# Patient Record
Sex: MALE | Race: WHITE | HISPANIC OR LATINO | Employment: FULL TIME | URBAN - METROPOLITAN AREA
[De-identification: names, ages, dates, MRNs, and addresses within clinical notes are randomized per-mention and may not be internally consistent; named-entity substitution may affect disease eponyms.]

---

## 2024-08-08 ENCOUNTER — APPOINTMENT (OUTPATIENT)
Dept: PRIMARY CARE | Facility: CLINIC | Age: 39
End: 2024-08-08

## 2024-08-08 DIAGNOSIS — M54.50 ACUTE LOW BACK PAIN, UNSPECIFIED BACK PAIN LATERALITY, UNSPECIFIED WHETHER SCIATICA PRESENT: Primary | ICD-10-CM

## 2024-08-08 DIAGNOSIS — R10.32 LEFT LOWER QUADRANT ABDOMINAL PAIN: ICD-10-CM

## 2024-08-08 DIAGNOSIS — R32: ICD-10-CM

## 2024-08-08 PROCEDURE — 99214 OFFICE O/P EST MOD 30 MIN: CPT | Performed by: FAMILY MEDICINE

## 2024-08-08 ASSESSMENT — ENCOUNTER SYMPTOMS
FREQUENCY: 0
PALPITATIONS: 0
SHORTNESS OF BREATH: 0
DYSURIA: 1
DIFFICULTY URINATING: 1

## 2024-08-08 NOTE — PROGRESS NOTES
Chief Complaint Diaz Caal is a 39 y.o. male who presents for back pain with radiation into the abdomen    HPI:  Pt states that h/o kidney stones and his pains feel exactly like his past kidney stones. Patient states that his pain starts in his back then radiates around waist in between hip bone and groin. Patient also endorses some difficulty urinating and feels like he needs to have a bowel movement. Denies any fever, chills, blood in the urine, or burning when he pees.        ROS:  Review of Systems   Respiratory:  Negative for shortness of breath.    Cardiovascular:  Negative for chest pain and palpitations.   Genitourinary:  Positive for difficulty urinating and dysuria. Negative for frequency.       Meds:  No current outpatient medications on file.    Allergies:  Not on File    PE:  There were no vitals taken for this visit.  Limited due to telehealth exam      Assessment/Plan  Diaz Caal is a 39 y.o. male who presents for back pain with radiation in the abdomen. This is likely nephrolithiasis but cannot rule out other etiologies at this time. A order for CT abdomen and pelvis was placed to look for nephrolithiasis and rule out other etiologies. Patient agreed with treatment and plan.    Diagnoses and all orders for this visit:  Acute low back pain, unspecified back pain laterality, unspecified whether sciatica present (Primary)  -     CT abdomen pelvis wo IV contrast; Future  Unspecified elimination disorder, with urinary symptoms  -     CT abdomen pelvis wo IV contrast; Future  Left lower quadrant abdominal pain  -     CT abdomen pelvis wo IV contrast; Future         Follow up with results.      Patient was staffed with Dr. Titi Lebron DO, PGY-2  [facility]

## 2024-08-10 NOTE — PROGRESS NOTES
I reviewed and examined the patient. I was present for the key exam elements, and I fully participated in the patient's care. I discussed the management of the care with the resident. I have personally reviewed the pertinent labs and imaging, as well as recent notes, with the patient. I have reviewed the note above and agree with the resident's medical decision making as documented in the resident's note, in addition to the following comments / findings:     Agree with the rest of the plan outlined below by resident physician. No red flags.      The patient understands and agrees to the assessment and plan of care. Patient has also agreed to follow up and comply with the treatment and evaluation as recommended today. Patient was instructed to call the office at [phone] should questions arise regarding their treatment or care.     Godwin Walls DO, FAOASM  Family Medicine   [address], [address]  [place] [address]     Godwin Walls DO

## 2024-08-27 ENCOUNTER — HOSPITAL ENCOUNTER (OUTPATIENT)
Dept: RADIOLOGY | Facility: HOSPITAL | Age: 39
Discharge: HOME | End: 2024-08-27
Payer: COMMERCIAL

## 2024-08-27 DIAGNOSIS — R10.32 LEFT LOWER QUADRANT ABDOMINAL PAIN: ICD-10-CM

## 2024-08-27 DIAGNOSIS — M54.50 ACUTE LOW BACK PAIN, UNSPECIFIED BACK PAIN LATERALITY, UNSPECIFIED WHETHER SCIATICA PRESENT: ICD-10-CM

## 2024-08-27 DIAGNOSIS — R32: ICD-10-CM

## 2024-08-27 PROCEDURE — 74176 CT ABD & PELVIS W/O CONTRAST: CPT

## 2024-08-28 ENCOUNTER — TELEPHONE (OUTPATIENT)
Dept: PRIMARY CARE | Facility: CLINIC | Age: 39
End: 2024-08-28
Payer: COMMERCIAL

## 2024-08-28 ENCOUNTER — HOSPITAL ENCOUNTER (EMERGENCY)
Facility: HOSPITAL | Age: 39
Discharge: HOME | End: 2024-08-28
Attending: STUDENT IN AN ORGANIZED HEALTH CARE EDUCATION/TRAINING PROGRAM
Payer: COMMERCIAL

## 2024-08-28 VITALS
HEART RATE: 85 BPM | WEIGHT: 233.47 LBS | BODY MASS INDEX: 38.9 KG/M2 | OXYGEN SATURATION: 96 % | RESPIRATION RATE: 16 BRPM | TEMPERATURE: 98.1 F | SYSTOLIC BLOOD PRESSURE: 149 MMHG | DIASTOLIC BLOOD PRESSURE: 96 MMHG | HEIGHT: 65 IN

## 2024-08-28 DIAGNOSIS — N20.0 KIDNEY STONE ON LEFT SIDE: Primary | ICD-10-CM

## 2024-08-28 LAB
ALBUMIN SERPL-MCNC: 4.9 G/DL (ref 3.5–5)
ALP BLD-CCNC: 79 U/L (ref 35–125)
ALT SERPL-CCNC: 34 U/L (ref 5–40)
ANION GAP SERPL CALC-SCNC: 10 MMOL/L
APPEARANCE UR: CLEAR
AST SERPL-CCNC: 21 U/L (ref 5–40)
BASOPHILS # BLD AUTO: 0.03 X10*3/UL (ref 0–0.1)
BASOPHILS NFR BLD AUTO: 0.3 %
BILIRUB SERPL-MCNC: 0.6 MG/DL (ref 0.1–1.2)
BILIRUB UR STRIP.AUTO-MCNC: NEGATIVE MG/DL
BUN SERPL-MCNC: 8 MG/DL (ref 8–25)
CALCIUM SERPL-MCNC: 9.8 MG/DL (ref 8.5–10.4)
CHLORIDE SERPL-SCNC: 99 MMOL/L (ref 97–107)
CO2 SERPL-SCNC: 29 MMOL/L (ref 24–31)
COLOR UR: YELLOW
CREAT SERPL-MCNC: 1 MG/DL (ref 0.4–1.6)
EGFRCR SERPLBLD CKD-EPI 2021: >90 ML/MIN/1.73M*2
EOSINOPHIL # BLD AUTO: 0.04 X10*3/UL (ref 0–0.7)
EOSINOPHIL NFR BLD AUTO: 0.4 %
ERYTHROCYTE [DISTWIDTH] IN BLOOD BY AUTOMATED COUNT: 12.3 % (ref 11.5–14.5)
GLUCOSE SERPL-MCNC: 99 MG/DL (ref 65–99)
GLUCOSE UR STRIP.AUTO-MCNC: NORMAL MG/DL
HCT VFR BLD AUTO: 51.2 % (ref 41–52)
HGB BLD-MCNC: 17.8 G/DL (ref 13.5–17.5)
IMM GRANULOCYTES # BLD AUTO: 0.04 X10*3/UL (ref 0–0.7)
IMM GRANULOCYTES NFR BLD AUTO: 0.4 % (ref 0–0.9)
KETONES UR STRIP.AUTO-MCNC: ABNORMAL MG/DL
LEUKOCYTE ESTERASE UR QL STRIP.AUTO: NEGATIVE
LYMPHOCYTES # BLD AUTO: 2.43 X10*3/UL (ref 1.2–4.8)
LYMPHOCYTES NFR BLD AUTO: 22.6 %
MCH RBC QN AUTO: 30.4 PG (ref 26–34)
MCHC RBC AUTO-ENTMCNC: 34.8 G/DL (ref 32–36)
MCV RBC AUTO: 88 FL (ref 80–100)
MONOCYTES # BLD AUTO: 0.79 X10*3/UL (ref 0.1–1)
MONOCYTES NFR BLD AUTO: 7.4 %
MUCOUS THREADS #/AREA URNS AUTO: NORMAL /LPF
NEUTROPHILS # BLD AUTO: 7.41 X10*3/UL (ref 1.2–7.7)
NEUTROPHILS NFR BLD AUTO: 68.9 %
NITRITE UR QL STRIP.AUTO: NEGATIVE
NRBC BLD-RTO: 0 /100 WBCS (ref 0–0)
PH UR STRIP.AUTO: 5.5 [PH]
PLATELET # BLD AUTO: 281 X10*3/UL (ref 150–450)
POTASSIUM SERPL-SCNC: 3.7 MMOL/L (ref 3.4–5.1)
PROT SERPL-MCNC: 7.9 G/DL (ref 5.9–7.9)
PROT UR STRIP.AUTO-MCNC: ABNORMAL MG/DL
RBC # BLD AUTO: 5.85 X10*6/UL (ref 4.5–5.9)
RBC # UR STRIP.AUTO: NEGATIVE /UL
RBC #/AREA URNS AUTO: NORMAL /HPF
SODIUM SERPL-SCNC: 138 MMOL/L (ref 133–145)
SP GR UR STRIP.AUTO: 1.03
UROBILINOGEN UR STRIP.AUTO-MCNC: ABNORMAL MG/DL
WBC # BLD AUTO: 10.7 X10*3/UL (ref 4.4–11.3)
WBC #/AREA URNS AUTO: NORMAL /HPF

## 2024-08-28 PROCEDURE — 85025 COMPLETE CBC W/AUTO DIFF WBC: CPT | Performed by: STUDENT IN AN ORGANIZED HEALTH CARE EDUCATION/TRAINING PROGRAM

## 2024-08-28 PROCEDURE — 36415 COLL VENOUS BLD VENIPUNCTURE: CPT | Performed by: STUDENT IN AN ORGANIZED HEALTH CARE EDUCATION/TRAINING PROGRAM

## 2024-08-28 PROCEDURE — 96374 THER/PROPH/DIAG INJ IV PUSH: CPT

## 2024-08-28 PROCEDURE — 81001 URINALYSIS AUTO W/SCOPE: CPT | Performed by: STUDENT IN AN ORGANIZED HEALTH CARE EDUCATION/TRAINING PROGRAM

## 2024-08-28 PROCEDURE — 99284 EMERGENCY DEPT VISIT MOD MDM: CPT

## 2024-08-28 PROCEDURE — 80053 COMPREHEN METABOLIC PANEL: CPT | Performed by: STUDENT IN AN ORGANIZED HEALTH CARE EDUCATION/TRAINING PROGRAM

## 2024-08-28 PROCEDURE — 2500000004 HC RX 250 GENERAL PHARMACY W/ HCPCS (ALT 636 FOR OP/ED): Performed by: STUDENT IN AN ORGANIZED HEALTH CARE EDUCATION/TRAINING PROGRAM

## 2024-08-28 RX ORDER — ONDANSETRON 4 MG/1
4 TABLET, ORALLY DISINTEGRATING ORAL EVERY 8 HOURS PRN
Qty: 20 TABLET | Refills: 0 | Status: SHIPPED | OUTPATIENT
Start: 2024-08-28 | End: 2024-09-04

## 2024-08-28 RX ORDER — KETOROLAC TROMETHAMINE 10 MG/1
10 TABLET, FILM COATED ORAL EVERY 6 HOURS PRN
Qty: 20 TABLET | Refills: 0 | Status: SHIPPED | OUTPATIENT
Start: 2024-08-28 | End: 2024-09-02

## 2024-08-28 RX ORDER — KETOROLAC TROMETHAMINE 30 MG/ML
15 INJECTION, SOLUTION INTRAMUSCULAR; INTRAVENOUS ONCE
Status: COMPLETED | OUTPATIENT
Start: 2024-08-28 | End: 2024-08-28

## 2024-08-28 ASSESSMENT — COLUMBIA-SUICIDE SEVERITY RATING SCALE - C-SSRS
6. HAVE YOU EVER DONE ANYTHING, STARTED TO DO ANYTHING, OR PREPARED TO DO ANYTHING TO END YOUR LIFE?: NO
2. HAVE YOU ACTUALLY HAD ANY THOUGHTS OF KILLING YOURSELF?: NO
1. IN THE PAST MONTH, HAVE YOU WISHED YOU WERE DEAD OR WISHED YOU COULD GO TO SLEEP AND NOT WAKE UP?: NO

## 2024-08-28 ASSESSMENT — PAIN SCALES - GENERAL: PAINLEVEL_OUTOF10: 3

## 2024-08-28 ASSESSMENT — PAIN DESCRIPTION - FREQUENCY: FREQUENCY: INTERMITTENT

## 2024-08-28 ASSESSMENT — PAIN DESCRIPTION - DESCRIPTORS
DESCRIPTORS: PRESSURE
DESCRIPTORS: DULL;ACHING;PRESSURE

## 2024-08-28 ASSESSMENT — PAIN - FUNCTIONAL ASSESSMENT: PAIN_FUNCTIONAL_ASSESSMENT: 0-10

## 2024-08-28 ASSESSMENT — PAIN DESCRIPTION - ONSET: ONSET: GRADUAL

## 2024-08-28 ASSESSMENT — PAIN DESCRIPTION - PROGRESSION: CLINICAL_PROGRESSION: NOT CHANGED

## 2024-08-28 ASSESSMENT — PAIN DESCRIPTION - PAIN TYPE: TYPE: ACUTE PAIN

## 2024-08-28 ASSESSMENT — PAIN DESCRIPTION - LOCATION: LOCATION: BACK

## 2024-08-28 ASSESSMENT — PAIN DESCRIPTION - ORIENTATION: ORIENTATION: LEFT;LOWER

## 2024-08-28 NOTE — TELEPHONE ENCOUNTER
Result Communication    Resulted Orders   CT abdomen pelvis wo IV contrast    Narrative    Interpreted By:  Sara Galloway,   STUDY:  CT ABDOMEN PELVIS WO IV CONTRAST;  8/27/2024 4:19 pm      INDICATION:  Signs/Symptoms:Back pain with radiation into abdomen, h/o kidney  stones.      ,M54.50 Low back pain, unspecified,R32 Unspecified urinary  incontinence,R10.32 Left lower quadrant pain      COMPARISON:  None      ACCESSION NUMBER(S):  [id number]      ORDERING CLINICIAN:  SUBHA MARTIN      TECHNIQUE:      Helical scans with axial reconstructions are performed from the lung  bases through the pubic symphysis with no oral or intravenous  contrast administered. Sagittal and coronal reformations are  completed by the technologist at the acquisition scanner.      FINDINGS:  Please note that the evaluation of vessels, lymph nodes and organs is  limited without intravenous contrast.      LOWER CHEST:  Within normal limits      ABDOMEN:      LIVER:  Within normal limits.      BILE DUCTS:  The intrahepatic and extrahepatic ducts are not dilated.      GALLBLADDER:  No calcified stones. No wall thickening.      PANCREAS:  Within normal limits.      SPLEEN:  Within normal limits.      ADRENAL GLANDS:  Within normal limits.      KIDNEYS AND URETERS:  There is a 0.5 x 0.8 x 1.0 cm calculus at the left ureteropelvic  junction producing mild left hydronephrosis. No additional left-sided  renal or ureteral calculus is seen. On the right, there is a 3 mm  nonobstructing intrarenal calculus seen. No right-sided  hydronephrosis or right-sided ureteral calculus is present. No renal  mass on either side is seen.      PELVIS:      BLADDER:  The urinary bladder appears normal without abnormal wall thickening.      REPRODUCTIVE ORGANS:  The prostate is not enlarged.      BOWEL:  The stomach, small and large bowel are normal in appearance without  wall thickening or obstruction.   The appendix appears normal.      VESSELS:  The aorta and IVC  appear normal.      PERITONEUM/RETROPERITONEUM/LYMPH NODES:  There is no free or loculated fluid collection, no free  intraperitoneal air. The retroperitoneum appears normal.  No  abdominopelvic lymphadenopathy is present.      ABDOMINAL WALL:  The abdominal wall soft tissues appear normal.      BONES:  Within normal limits.        Impression    1.  0.5 x 0.8 x 1.0 cm calculus at left ureteropelvic junction  producing mild left hydronephrosis.  2. 3 mm nonobstructing right renal calculus.          MACRO:  Critical Finding:  See findings. Notification was initiated on  8/28/2024 at 7:00 pm by  Sara Galloway.  (**-YCF-**) Instructions:      Signed by: Sara Galloway 8/28/2024 7:00 PM  Dictation workstation:   [id number]       7:25 PM      Results were successfully communicated with the patient and they acknowledged their understanding.    Called the patient about his results. Patient stated that he is noting some chills, increase in pain, and not feeling well. Recommended that the patient go to the ER for evaluation due to potential kidney infection 2/2 obstructing nephrolithiasis. Patient was agreeable to this plan and stated that he preferred to go to [facility]. [facility] was called and given report. Also told patient that if he changes his mind to call the answering service back so we can start him on some antibiotics, tamsulosin, and get a UA with urine culture and some bloodwork.    Italia Lebron DO, PGY-3  [facility]

## 2024-08-29 ENCOUNTER — OFFICE VISIT (OUTPATIENT)
Dept: UROLOGY | Facility: CLINIC | Age: 39
End: 2024-08-29
Payer: COMMERCIAL

## 2024-08-29 VITALS — WEIGHT: 233 LBS | HEIGHT: 65 IN | BODY MASS INDEX: 38.82 KG/M2

## 2024-08-29 DIAGNOSIS — N20.0 LEFT RENAL STONE: Primary | ICD-10-CM

## 2024-08-29 PROCEDURE — 3008F BODY MASS INDEX DOCD: CPT | Performed by: UROLOGY

## 2024-08-29 PROCEDURE — 99204 OFFICE O/P NEW MOD 45 MIN: CPT | Performed by: UROLOGY

## 2024-08-29 ASSESSMENT — PAIN SCALES - GENERAL: PAINLEVEL: 2

## 2024-08-29 NOTE — DISCHARGE INSTRUCTIONS
Optimal management of a kidney stone includes adequate hydration, pain control with oxycodone or toradol. You may also be prescribed Flomax to promote passage of the stone and zofran for treatment of nausea. Most kidney stones pass within 2-3 days after symptoms start. Follow up with urology Dr. Goldstein in 24-48 hours to schedule an appointment for further evaluation and management. Return to the ED for any new or concerning symptoms including fever over 100.4 farenheit which would be suggestive of infection, worsening flank pain that you are unable to manage at home, or any worsening symptoms despite treatment.

## 2024-08-30 PROBLEM — N20.0 LEFT RENAL STONE: Status: ACTIVE | Noted: 2024-08-29

## 2024-08-30 NOTE — ED PROVIDER NOTES
HPI   Chief Complaint   Patient presents with    Flank Pain     Pt states he had a ct scan yesterday and dr called him and told him the kidney stone had caused an obstruction. Pt states intermittent pain in the left flank that radiates to left hip and LLQ.    Abdominal Pain       This is a 39-year-old male who presents to the emergency department for evaluation of left-sided flank pain.  He states that he has had flank pain intermittently for the past 1 to 2 months, when the pain comes on it is unbearable, can be up to 10 out of 10 in severity, effects the left flank and radiates down into the left side of the groin.  He does have a history of kidney stones, he had CT imaging performed on the 27th of this month showing a large 1 cm proximal kidney stone on the left side likely causative of his symptoms.  He was instructed to come to the ED after this was identified for further assessment and management because he was having increasing pain in his left flank for the past day or 2.      History provided by:  Patient and medical records   used: No            Patient History   History reviewed. No pertinent past medical history.  History reviewed. No pertinent surgical history.  No family history on file.  Social History     Tobacco Use    Smoking status: Never    Smokeless tobacco: Current     Types: Chew   Vaping Use    Vaping status: Never Used   Substance Use Topics    Alcohol use: Not Currently    Drug use: Never       Physical Exam   ED Triage Vitals [08/28/24 2033]   Temperature Heart Rate Respirations BP   36.7 °C (98.1 °F) 85 16 (!) 149/96      Pulse Ox Temp Source Heart Rate Source Patient Position   96 % Tympanic Monitor Sitting      BP Location FiO2 (%)     Right arm --       Physical Exam  General: well developed, well nourished adult male who is awake and alert, in no apparent distress  CV: regular rate and rhythm, no murmur, no gallops, or rubs.  Resp: clear to ascultation bilaterally,  no wheezes, rales, or rhonchi  GI: abdomen soft, mild left lower abdominal tenderness to palpation without rigidity or guarding, no peritoneal signs, abdomen is nondistended, no masses palpated  MSK: No midline spinal tenderness, no CVA tenderness, no swelling of the extremities.  Neuro: no focal deficits, CN2-12 intact. Sensation fully intact.  Psych: appropriate mood and affect, cooperative with exam  Skin: warm, dry, without evidence of rash or abrasions    ED Course & MDM   Diagnoses as of 08/30/24 1059   Kidney stone on left side                 No data recorded     [place] Coma Scale Score: 15 (08/28/24 2057 : Galen Harris RN)                           Medical Decision Making  The patient is relatively comfortable appearing in the ED, he is in no acute distress.  He complains that he has a headache which is more severe than his flank pain which is mild at this time.  He is hypertensive, vitals otherwise normal.  He is afebrile, not tachycardic.  Medical workup in the ED today shows no leukocytosis, no bacteria or nitrates in the urine, no evidence of UTI.  No evidence of sepsis.  Kidney function is normal.  I reviewed the CT image from his most recent visit showing a 1 cm stone at the left renal pelvis/UPJ with mild hydronephrosis present.  There is no evidence of complete obstruction of the ureter on this image.  I do not feel there is a strong indication to repeat imaging today.  He has not passed the kidney stone to his knowledge.  He was given IV Toradol for pain control with resolution of his symptoms.    Case was discussed with urologist Dr. Goldstein who recommends having the patient follow-up in the office for further treatment planning, he did not feel the patient emergently needs a stent or intervention at this time given his otherwise benign workup and well-controlled symptoms.  Return precautions were discussed including signs of infection, intractable pain, intractable nausea or vomiting  although which may require additional management at the hospital.  The patient is agreeable to plan, he will follow-up later this week with the urologist, he was discharged in improved condition.      Procedure  Procedures     Juan Radford DO  08/30/24 1104

## 2024-08-30 NOTE — PROGRESS NOTES
"  Patient is a 39 y.o. male presenting with a left renal stone    SUBJECTIVE:  HPI   CT scan identified an 8 mm stone in the proximal left ureter.  He has not seen a stone pass.  A 3 mm right non obstruucting renal stone was also present.  He has had intermittent pain for a couple months.        No results found for: \"URINECULTURE\"     No past medical history on file.   No past surgical history on file.   No family history on file.   Social History     Socioeconomic History    Marital status:    Tobacco Use    Smoking status: Never    Smokeless tobacco: Current     Types: Chew   Vaping Use    Vaping status: Never Used   Substance and Sexual Activity    Alcohol use: Not Currently    Drug use: Never    Sexual activity: Yes        Review of Systems   Constitutional: denies any unintentional weight loss or change in strength.  Integumentary: denies any rashes or pruritus.  Eyes: denies any double vision or eye pain.  Ear/Nose/Mouth/Throat: denies any nosebleeds or gum bleeds.  Cardiovascular: denies any chest pain or syncope.  Respiratory: denies hemoptysis.  Gastrointestinal: denies nausea or vomiting.  Musculoskeletal: denies muscle cramping or weakness.  Neurologic: denies convulsions or seizures.  Hematologic/Lymphatic: denies bleeding tendencies.  Endocrine: denies heat/cold intolerance.  All other systems have been reviewed and are negative unless otherwise noted in the HPI.    OBJECTIVE:  There were no vitals taken for this visit.  Physical Exam   Constitutional: No obvious distress.  Eyes: Non-injected conjunctiva, sclera clear, EOMI.  Ears/Nose/Mouth/Throat: No obvious drainage per ears or nose.  Cardiovascular: Extremities are warm and well perfused. No edema, cyanosis or pallor.  Respiratory: No audible wheezing/stridor; respirations do not appear labored.  Gastrointestinal: Abdomen soft, not distended.  Musculoskeletal: Normal ROM of extremities.  Skin: No obvious rashes or open sores.  Neurologic: " "Alert and oriented, CN 2-12 grossly intact.  Psychiatric: Answers questions appropriately with normal affect.  Hematologic/Lymphatic/Immunologic: No obvious bruises or sites of spontaneous bleeding.  Genitourinary: No CVA tenderness, bladder not palpable.     Labs:  Lab Results   Component Value Date    WBC 10.7 08/28/2024    HGB 17.8 (H) 08/28/2024    HCT 51.2 08/28/2024     08/28/2024    CHOL 170 05/06/2022    TRIG 120 05/06/2022    HDL 44 05/06/2022    ALT 34 08/28/2024    AST 21 08/28/2024     08/28/2024    K 3.7 08/28/2024    CL 99 08/28/2024    CREATININE 1.00 08/28/2024    BUN 8 08/28/2024    CO2 29 08/28/2024    TSH 1.13 05/06/2022    HGBA1C 5.2 05/06/2022     No results found for: \"KPSAT\", \"KPSAP\"  IMAGING:        PROCEDURES:    ASSESSMENT/PLAN:  Problem List Items Addressed This Visit    None  Visit Diagnoses       Left renal stone    -  Primary    Relevant Orders    Case Request Operating Room: Lithotripsy Extracorporeal Shock Wave (Completed)    Request for Pre-Admission Testing Visit    XR abdomen 1 view            He has an 8 mm stone in the proximal left ureter.  We reviewed options for treatment.  He was sent for a KUB today.  He will be scheduled for left ESWL.  Hold blood thinners for a week prior.    All questions were answered to the patient’s satisfaction.  Patient agrees with the plan and wishes to proceed.  Follow-up will be scheduled appropriately.     Juwan Goldstein MD  "

## 2024-09-09 ENCOUNTER — APPOINTMENT (OUTPATIENT)
Dept: UROLOGY | Facility: HOSPITAL | Age: 39
End: 2024-09-09
Payer: COMMERCIAL

## 2024-09-11 ENCOUNTER — PRE-ADMISSION TESTING (OUTPATIENT)
Dept: PREADMISSION TESTING | Facility: HOSPITAL | Age: 39
End: 2024-09-11
Payer: COMMERCIAL

## 2024-09-11 ENCOUNTER — HOSPITAL ENCOUNTER (OUTPATIENT)
Dept: RADIOLOGY | Facility: HOSPITAL | Age: 39
Discharge: HOME | End: 2024-09-11
Payer: COMMERCIAL

## 2024-09-11 VITALS
HEART RATE: 90 BPM | WEIGHT: 240 LBS | DIASTOLIC BLOOD PRESSURE: 91 MMHG | BODY MASS INDEX: 39.99 KG/M2 | HEIGHT: 65 IN | OXYGEN SATURATION: 100 % | RESPIRATION RATE: 16 BRPM | TEMPERATURE: 97.3 F | SYSTOLIC BLOOD PRESSURE: 133 MMHG

## 2024-09-11 DIAGNOSIS — N20.0 LEFT RENAL STONE: ICD-10-CM

## 2024-09-11 DIAGNOSIS — Z01.818 PRE-OP TESTING: Primary | ICD-10-CM

## 2024-09-11 PROCEDURE — 99204 OFFICE O/P NEW MOD 45 MIN: CPT | Performed by: PHYSICIAN ASSISTANT

## 2024-09-11 PROCEDURE — 74018 RADEX ABDOMEN 1 VIEW: CPT

## 2024-09-11 RX ORDER — KETOROLAC TROMETHAMINE 10 MG/1
10 TABLET, FILM COATED ORAL EVERY 6 HOURS PRN
COMMUNITY

## 2024-09-11 ASSESSMENT — DUKE ACTIVITY SCORE INDEX (DASI)
CAN YOU WALK A BLOCK OR TWO ON LEVEL GROUND: YES
DASI METS SCORE: 9.9
CAN YOU DO YARD WORK LIKE RAKING LEAVES, WEEDING OR PUSHING A MOWER: YES
CAN YOU RUN A SHORT DISTANCE: YES
CAN YOU DO LIGHT WORK AROUND THE HOUSE LIKE DUSTING OR WASHING DISHES: YES
CAN YOU WALK INDOORS, SUCH AS AROUND YOUR HOUSE: YES
CAN YOU PARTICIPATE IN MODERATE RECREATIONAL ACTIVITIES LIKE GOLF, BOWLING, DANCING, DOUBLES TENNIS OR THROWING A BASEBALL OR FOOTBALL: YES
TOTAL_SCORE: 58.2
CAN YOU TAKE CARE OF YOURSELF (EAT, DRESS, BATHE, OR USE TOILET): YES
CAN YOU PARTICIPATE IN STRENOUS SPORTS LIKE SWIMMING, SINGLES TENNIS, FOOTBALL, BASKETBALL, OR SKIING: YES
CAN YOU HAVE SEXUAL RELATIONS: YES
CAN YOU CLIMB A FLIGHT OF STAIRS OR WALK UP A HILL: YES
CAN YOU DO MODERATE WORK AROUND THE HOUSE LIKE VACUUMING, SWEEPING FLOORS OR CARRYING GROCERIES: YES
CAN YOU DO HEAVY WORK AROUND THE HOUSE LIKE SCRUBBING FLOORS OR LIFTING AND MOVING HEAVY FURNITURE: YES

## 2024-09-11 NOTE — CPM/PAT H&P
"CPM/PAT Evaluation       Name: Diaz Caal (Diaz Caal)  /Age: 1985/39 y.o.     In-Person       Chief Complaint: \"kidney stone\"    HPI  The patient is a 39 year old male with a past history of kidney stones.  He has never required surgical intervention.  Over the past 2 months he has had intermittent episodes of left flank pain that radiates to the left inguinal area and umbilicus.  He has associated urinary hesitancy and a weak stream.  He denied dysuria or hematuria.  The episodes would last for 1-2 hours and they would resolve spontaneously.  On 2024 he had a severe episode that lasted several hours.  He had associated chills.  His PCP ordered a CT of the abdomen and pelvis on 2024 and this demonstrated a 0.5 x 0.8 x 1.0 cm calculus at left ureteropelvic junction  producing mild left hydronephrosis.  2. 3 mm nonobstructing right renal calculus.  He was referred to [facility] ED on 2024 and Dr. Goldstein was consulted.  He was given Toradol for pain and he improved.  Surgical intervention was recommended and this is scheduled for 2024.    Past Medical History:   Diagnosis Date    Nephrolithiasis     Obesity        Past Surgical History:   Procedure Laterality Date    KNEE SURGERY Left     Arthroscopy     Social History     Tobacco Use    Smoking status: Never    Smokeless tobacco: Former     Types: Chew     Quit date: 2023   Substance Use Topics    Alcohol use: Yes     Alcohol/week: 2.0 standard drinks of alcohol     Types: 2 Standard drinks or equivalent per week     Social History     Substance and Sexual Activity   Drug Use Never     No Known Allergies    Current Outpatient Medications   Medication Sig Dispense Refill    ketorolac (Toradol) 10 mg tablet Take 1 tablet (10 mg) by mouth every 6 hours if needed for moderate pain (4 - 6).       No current facility-administered medications for this visit.     Review of Systems   Genitourinary:         See HPI   All " "other systems reviewed and are negative.    BP (!) 133/91   Pulse 90   Temp 36.3 °C (97.3 °F) (Temporal)   Resp 16   Ht 1.651 m (5' 5\")   Wt 109 kg (240 lb)   SpO2 100%   BMI 39.94 kg/m²     Physical Exam  Vitals reviewed.   Constitutional:       Comments: Overweight     HENT:      Head: Normocephalic and atraumatic.      Mouth/Throat:      Mouth: Mucous membranes are moist.      Pharynx: Oropharynx is clear.   Eyes:      Extraocular Movements: Extraocular movements intact.      Pupils: Pupils are equal, round, and reactive to light.   Cardiovascular:      Rate and Rhythm: Normal rate and regular rhythm.      Heart sounds: Normal heart sounds.   Pulmonary:      Breath sounds: Normal breath sounds.   Abdominal:      General: Bowel sounds are normal.      Palpations: Abdomen is soft.   Musculoskeletal:         General: No swelling.   Skin:     General: Skin is warm and dry.   Neurological:      General: No focal deficit present.      Mental Status: He is alert and oriented to person, place, and time.   Psychiatric:         Mood and Affect: Mood normal.         Behavior: Behavior normal.        PAT AIRWAY:   Airway:     Mallampati::  II    TM distance::  >3 FB    Neck ROM::  Full   Teeth intact    ASA:  II  DASI SCORE:  58.2  METS SCORE:  9.9  CHAD2 SCORE:  1.9%  REVISED CARDIAC RISK INDEX:  0.4%  STOP BANG SCORE:  4    CBC, CMP done 8/28/2024    Assessment and Plan:     Left renal stone:  Left extracorporeal shock wave lithotripsy  Obesity - BMI:  39.94    Caitlin Henry PA-C          "

## 2024-09-11 NOTE — PREPROCEDURE INSTRUCTIONS
PAT DISCHARGE INSTRUCTIONS    Please call the Same Day Surgery (SDS) Department of the hospital where your procedure will be performed after 2:00 PM the day before your surgery. If you are scheduled on a Monday, or a Tuesday following a Monday holiday, you will need to call on the last business day prior to your surgery.    [facility]  [address], [address]  [phone]    [facility]  [address] [place] [address] [phone]    [facility]  [address]  [place] [id number]  [phone]    Please let your surgeon know if:      You develop any open sores, shingles, burning or painful urination as these may increase your risk of an infection.   You no longer wish to have the surgery.   Any other personal circumstances change that may lead to the need to cancel or defer this surgery-such as being sick or getting admitted to any hospital within one week of your planned procedure.    Your contact details change, such as a change of address or phone number.    Starting now:     Please DO NOT drink alcohol or smoke for 24 hours before surgery. It is well known that quitting smoking can make a huge difference to your health and recovery from surgery. The longer you abstain from smoking, the better your chances of a healthy recovery. If you need help with quitting, call 1-800-QUIT-NOW to be connected to a trained counselor who will discuss the best methods to help you quit.     Before your surgery:    Please stop all supplements 7 days prior to surgery. Or as directed by your surgeon.   Please stop taking NSAID pain medicine such as Advil and Motrin 7 days before surgery.    If you develop any fever, cough, cold, rashes, cuts, scratches, scrapes, urinary symptoms or infection anywhere on your body (including teeth and gums) prior to surgery, please call your surgeon’s office as soon as  possible. This may require treatment to reduce the chance of cancellation on the day of surgery.    The day before your surgery:   Get a good night’s rest.  Use the special soap for bathing if you have been instructed to use one.    Scheduled surgery times may change and you will be notified if this occurs - please check your personal voicemail for any updates.     On the morning of surgery:   Wear comfortable, loose fitting clothes which open in the front. Please do not wear moisturizers, creams, lotions, makeup or perfume.    Please bring with you to surgery:   Photo ID and insurance card   Current list of medicines and allergies   Pacemaker/ Defibrillator/Heart stent cards   CPAP machine and mask    Slings/ splints/ crutches   A copy of your complete advanced directive/DHPOA.    Please do NOT bring with you to surgery:   All jewelry and valuables should be left at home.   Prosthetic devices such as contact lenses, hearing aids, dentures, eyelash extensions, hairpins and body piercings must be removed prior to going in to the surgical suite.    After outpatient surgery:   A responsible adult MUST accompany you at the time of discharge and stay with you for 24 hours after your surgery. You may NOT drive yourself home after surgery.    Do not drive, operate machinery, make critical decisions or do activities that require co-ordination or balance until after a night’s sleep.   Do not drink alcoholic beverages for 24 hours.   Instructions for resuming your medications will be provided by your surgeon.    CALL YOUR DOCTOR AFTER SURGERY IF YOU HAVE:     Chills and/or a fever of 101° F or higher.    Redness, swelling, pus or drainage from your surgical wound or a bad smell from the wound.    Lightheadedness, fainting or confusion.    Persistent vomiting (throwing up) and are not able to eat or drink for 12 hours.    Three or more loose, watery bowel movements in 24 hours (diarrhea).   Difficulty or pain while urinating(  after non-urological surgery)    Pain and swelling in your legs, especially if it is only on one side.    Difficulty breathing or are breathing faster than normal.    Any new concerning symptoms.          Preoperative Fasting Guidelines    Why must I stop eating and drinking near surgery time?  With sedation, food or liquid in your stomach can enter your lungs causing serious complications  Increases nausea and vomiting    When do I need to stop eating and drinking before my surgery?  Do not eat any food after midnight the night before your surgery/procedure.  You may have up to 13 ounces of clear liquid until TWO hours before your instructed arrival time to the hospital.  This includes water, black tea/coffee, (no milk or cream) apple juice, and electrolyte drinks (Gatorade)  You may chew gum until TWO hours before your surgery/procedure           Medication List            Accurate as of September 11, 2024  2:30 PM. Always use your most recent med list.                ketorolac 10 mg tablet  Commonly known as: Toradol

## 2024-09-11 NOTE — H&P (VIEW-ONLY)
"CPM/PAT Evaluation       Name: Diaz Caal (Diaz Caal)  /Age: 1985/39 y.o.     In-Person       Chief Complaint: \"kidney stone\"    HPI  The patient is a 39 year old male with a past history of kidney stones.  He has never required surgical intervention.  Over the past 2 months he has had intermittent episodes of left flank pain that radiates to the left inguinal area and umbilicus.  He has associated urinary hesitancy and a weak stream.  He denied dysuria or hematuria.  The episodes would last for 1-2 hours and they would resolve spontaneously.  On 2024 he had a severe episode that lasted several hours.  He had associated chills.  His PCP ordered a CT of the abdomen and pelvis on 2024 and this demonstrated a 0.5 x 0.8 x 1.0 cm calculus at left ureteropelvic junction  producing mild left hydronephrosis.  2. 3 mm nonobstructing right renal calculus.  He was referred to [facility] ED on 2024 and Dr. Goldstein was consulted.  He was given Toradol for pain and he improved.  Surgical intervention was recommended and this is scheduled for 2024.    Past Medical History:   Diagnosis Date    Nephrolithiasis     Obesity        Past Surgical History:   Procedure Laterality Date    KNEE SURGERY Left     Arthroscopy     Social History     Tobacco Use    Smoking status: Never    Smokeless tobacco: Former     Types: Chew     Quit date: 2023   Substance Use Topics    Alcohol use: Yes     Alcohol/week: 2.0 standard drinks of alcohol     Types: 2 Standard drinks or equivalent per week     Social History     Substance and Sexual Activity   Drug Use Never     No Known Allergies    Current Outpatient Medications   Medication Sig Dispense Refill    ketorolac (Toradol) 10 mg tablet Take 1 tablet (10 mg) by mouth every 6 hours if needed for moderate pain (4 - 6).       No current facility-administered medications for this visit.     Review of Systems   Genitourinary:         See HPI   All " "other systems reviewed and are negative.    BP (!) 133/91   Pulse 90   Temp 36.3 °C (97.3 °F) (Temporal)   Resp 16   Ht 1.651 m (5' 5\")   Wt 109 kg (240 lb)   SpO2 100%   BMI 39.94 kg/m²     Physical Exam  Vitals reviewed.   Constitutional:       Comments: Overweight     HENT:      Head: Normocephalic and atraumatic.      Mouth/Throat:      Mouth: Mucous membranes are moist.      Pharynx: Oropharynx is clear.   Eyes:      Extraocular Movements: Extraocular movements intact.      Pupils: Pupils are equal, round, and reactive to light.   Cardiovascular:      Rate and Rhythm: Normal rate and regular rhythm.      Heart sounds: Normal heart sounds.   Pulmonary:      Breath sounds: Normal breath sounds.   Abdominal:      General: Bowel sounds are normal.      Palpations: Abdomen is soft.   Musculoskeletal:         General: No swelling.   Skin:     General: Skin is warm and dry.   Neurological:      General: No focal deficit present.      Mental Status: He is alert and oriented to person, place, and time.   Psychiatric:         Mood and Affect: Mood normal.         Behavior: Behavior normal.        PAT AIRWAY:   Airway:     Mallampati::  II    TM distance::  >3 FB    Neck ROM::  Full   Teeth intact    ASA:  II  DASI SCORE:  58.2  METS SCORE:  9.9  CHAD2 SCORE:  1.9%  REVISED CARDIAC RISK INDEX:  0.4%  STOP BANG SCORE:  4    CBC, CMP done 8/28/2024    Assessment and Plan:     Left renal stone:  Left extracorporeal shock wave lithotripsy  Obesity - BMI:  39.94    Caitlin Henry PA-C          "

## 2024-09-13 ENCOUNTER — ANESTHESIA (OUTPATIENT)
Dept: OPERATING ROOM | Facility: HOSPITAL | Age: 39
End: 2024-09-13
Payer: COMMERCIAL

## 2024-09-13 ENCOUNTER — ANESTHESIA EVENT (OUTPATIENT)
Dept: OPERATING ROOM | Facility: HOSPITAL | Age: 39
End: 2024-09-13
Payer: COMMERCIAL

## 2024-09-13 ENCOUNTER — HOSPITAL ENCOUNTER (OUTPATIENT)
Facility: HOSPITAL | Age: 39
Setting detail: OUTPATIENT SURGERY
Discharge: HOME | End: 2024-09-13
Attending: UROLOGY | Admitting: UROLOGY
Payer: COMMERCIAL

## 2024-09-13 VITALS
DIASTOLIC BLOOD PRESSURE: 92 MMHG | HEART RATE: 79 BPM | HEIGHT: 65 IN | OXYGEN SATURATION: 99 % | TEMPERATURE: 97.3 F | RESPIRATION RATE: 14 BRPM | SYSTOLIC BLOOD PRESSURE: 140 MMHG | WEIGHT: 235.45 LBS | BODY MASS INDEX: 39.23 KG/M2

## 2024-09-13 DIAGNOSIS — N20.0 LEFT RENAL STONE: Primary | ICD-10-CM

## 2024-09-13 PROBLEM — K21.9 GASTROESOPHAGEAL REFLUX DISEASE: Status: ACTIVE | Noted: 2024-09-13

## 2024-09-13 PROBLEM — E66.9 OBESITY: Status: ACTIVE | Noted: 2024-09-13

## 2024-09-13 PROCEDURE — 3700000002 HC GENERAL ANESTHESIA TIME - EACH INCREMENTAL 1 MINUTE: Performed by: UROLOGY

## 2024-09-13 PROCEDURE — 50590 FRAGMENTING OF KIDNEY STONE: CPT | Performed by: UROLOGY

## 2024-09-13 PROCEDURE — 2500000005 HC RX 250 GENERAL PHARMACY W/O HCPCS: Performed by: NURSE ANESTHETIST, CERTIFIED REGISTERED

## 2024-09-13 PROCEDURE — 7100000002 HC RECOVERY ROOM TIME - EACH INCREMENTAL 1 MINUTE: Performed by: UROLOGY

## 2024-09-13 PROCEDURE — 3600000008 HC OR TIME - EACH INCREMENTAL 1 MINUTE - PROCEDURE LEVEL THREE: Performed by: UROLOGY

## 2024-09-13 PROCEDURE — 3600000003 HC OR TIME - INITIAL BASE CHARGE - PROCEDURE LEVEL THREE: Performed by: UROLOGY

## 2024-09-13 PROCEDURE — 2500000004 HC RX 250 GENERAL PHARMACY W/ HCPCS (ALT 636 FOR OP/ED): Performed by: ANESTHESIOLOGY

## 2024-09-13 PROCEDURE — 2500000004 HC RX 250 GENERAL PHARMACY W/ HCPCS (ALT 636 FOR OP/ED): Mod: JZ | Performed by: UROLOGY

## 2024-09-13 PROCEDURE — 7100000001 HC RECOVERY ROOM TIME - INITIAL BASE CHARGE: Performed by: UROLOGY

## 2024-09-13 PROCEDURE — 3700000001 HC GENERAL ANESTHESIA TIME - INITIAL BASE CHARGE: Performed by: UROLOGY

## 2024-09-13 PROCEDURE — 2500000004 HC RX 250 GENERAL PHARMACY W/ HCPCS (ALT 636 FOR OP/ED): Performed by: NURSE ANESTHETIST, CERTIFIED REGISTERED

## 2024-09-13 PROCEDURE — 7100000010 HC PHASE TWO TIME - EACH INCREMENTAL 1 MINUTE: Performed by: UROLOGY

## 2024-09-13 PROCEDURE — 7100000009 HC PHASE TWO TIME - INITIAL BASE CHARGE: Performed by: UROLOGY

## 2024-09-13 RX ORDER — FENTANYL CITRATE 50 UG/ML
12.5 INJECTION, SOLUTION INTRAMUSCULAR; INTRAVENOUS EVERY 5 MIN PRN
Status: DISCONTINUED | OUTPATIENT
Start: 2024-09-13 | End: 2024-09-13 | Stop reason: HOSPADM

## 2024-09-13 RX ORDER — OXYCODONE HYDROCHLORIDE 5 MG/1
5 TABLET ORAL EVERY 4 HOURS PRN
Status: DISCONTINUED | OUTPATIENT
Start: 2024-09-13 | End: 2024-09-13 | Stop reason: HOSPADM

## 2024-09-13 RX ORDER — LIDOCAINE HYDROCHLORIDE 10 MG/ML
INJECTION, SOLUTION EPIDURAL; INFILTRATION; INTRACAUDAL; PERINEURAL AS NEEDED
Status: DISCONTINUED | OUTPATIENT
Start: 2024-09-13 | End: 2024-09-13

## 2024-09-13 RX ORDER — CEFAZOLIN SODIUM 2 G/100ML
2 INJECTION, SOLUTION INTRAVENOUS ONCE
Status: COMPLETED | OUTPATIENT
Start: 2024-09-13 | End: 2024-09-13

## 2024-09-13 RX ORDER — HYDROMORPHONE HYDROCHLORIDE 0.2 MG/ML
0.2 INJECTION INTRAMUSCULAR; INTRAVENOUS; SUBCUTANEOUS EVERY 5 MIN PRN
Status: DISCONTINUED | OUTPATIENT
Start: 2024-09-13 | End: 2024-09-13 | Stop reason: HOSPADM

## 2024-09-13 RX ORDER — OXYCODONE AND ACETAMINOPHEN 5; 325 MG/1; MG/1
1 TABLET ORAL EVERY 6 HOURS PRN
Qty: 8 TABLET | Refills: 0 | Status: SHIPPED | OUTPATIENT
Start: 2024-09-13

## 2024-09-13 RX ORDER — KETOROLAC TROMETHAMINE 30 MG/ML
INJECTION, SOLUTION INTRAMUSCULAR; INTRAVENOUS AS NEEDED
Status: DISCONTINUED | OUTPATIENT
Start: 2024-09-13 | End: 2024-09-13

## 2024-09-13 RX ORDER — SODIUM CHLORIDE, SODIUM LACTATE, POTASSIUM CHLORIDE, CALCIUM CHLORIDE 600; 310; 30; 20 MG/100ML; MG/100ML; MG/100ML; MG/100ML
20 INJECTION, SOLUTION INTRAVENOUS CONTINUOUS
Status: DISCONTINUED | OUTPATIENT
Start: 2024-09-13 | End: 2024-09-13 | Stop reason: HOSPADM

## 2024-09-13 RX ORDER — LIDOCAINE HYDROCHLORIDE 10 MG/ML
0.1 INJECTION, SOLUTION EPIDURAL; INFILTRATION; INTRACAUDAL; PERINEURAL ONCE
Status: DISCONTINUED | OUTPATIENT
Start: 2024-09-13 | End: 2024-09-13 | Stop reason: HOSPADM

## 2024-09-13 RX ORDER — ONDANSETRON HYDROCHLORIDE 2 MG/ML
INJECTION, SOLUTION INTRAVENOUS AS NEEDED
Status: DISCONTINUED | OUTPATIENT
Start: 2024-09-13 | End: 2024-09-13

## 2024-09-13 RX ORDER — MIDAZOLAM HYDROCHLORIDE 1 MG/ML
INJECTION, SOLUTION INTRAMUSCULAR; INTRAVENOUS AS NEEDED
Status: DISCONTINUED | OUTPATIENT
Start: 2024-09-13 | End: 2024-09-13

## 2024-09-13 RX ORDER — DROPERIDOL 2.5 MG/ML
0.62 INJECTION, SOLUTION INTRAMUSCULAR; INTRAVENOUS ONCE AS NEEDED
Status: DISCONTINUED | OUTPATIENT
Start: 2024-09-13 | End: 2024-09-13 | Stop reason: HOSPADM

## 2024-09-13 RX ORDER — SODIUM CHLORIDE, SODIUM LACTATE, POTASSIUM CHLORIDE, CALCIUM CHLORIDE 600; 310; 30; 20 MG/100ML; MG/100ML; MG/100ML; MG/100ML
100 INJECTION, SOLUTION INTRAVENOUS CONTINUOUS
Status: DISCONTINUED | OUTPATIENT
Start: 2024-09-13 | End: 2024-09-13 | Stop reason: HOSPADM

## 2024-09-13 RX ORDER — FENTANYL CITRATE 50 UG/ML
INJECTION, SOLUTION INTRAMUSCULAR; INTRAVENOUS AS NEEDED
Status: DISCONTINUED | OUTPATIENT
Start: 2024-09-13 | End: 2024-09-13

## 2024-09-13 RX ORDER — PROPOFOL 10 MG/ML
INJECTION, EMULSION INTRAVENOUS AS NEEDED
Status: DISCONTINUED | OUTPATIENT
Start: 2024-09-13 | End: 2024-09-13

## 2024-09-13 RX ORDER — ONDANSETRON HYDROCHLORIDE 2 MG/ML
4 INJECTION, SOLUTION INTRAVENOUS ONCE AS NEEDED
Status: DISCONTINUED | OUTPATIENT
Start: 2024-09-13 | End: 2024-09-13 | Stop reason: HOSPADM

## 2024-09-13 RX ORDER — ACETAMINOPHEN 325 MG/1
650 TABLET ORAL EVERY 4 HOURS PRN
Status: DISCONTINUED | OUTPATIENT
Start: 2024-09-13 | End: 2024-09-13 | Stop reason: HOSPADM

## 2024-09-13 SDOH — HEALTH STABILITY: MENTAL HEALTH: CURRENT SMOKER: 0

## 2024-09-13 ASSESSMENT — PAIN SCALES - GENERAL
PAINLEVEL_OUTOF10: 0 - NO PAIN
PAIN_LEVEL: 2
PAINLEVEL_OUTOF10: 0 - NO PAIN
PAINLEVEL_OUTOF10: 0 - NO PAIN

## 2024-09-13 ASSESSMENT — PAIN - FUNCTIONAL ASSESSMENT
PAIN_FUNCTIONAL_ASSESSMENT: 0-10

## 2024-09-13 NOTE — OP NOTE
Lithotripsy Extracorporeal Shock Wave ( REP JIM FENG [id number] ) (L) Operative Note     Date: 2024  OR Location: [facility]    Name: Diaz Caal, : 1985, Age: 39 y.o., MRN: [id number], Sex: male    Diagnosis  Pre-op Diagnosis      * Left renal stone [N20.0] Post-op Diagnosis     * Left renal stone [N20.0]     Procedures  Lithotripsy Extracorporeal Shock Wave ( REP JIM FENG [id number] )  [address] - [place] LITHOTRIPSY XTRCORP SHOCK WAVE      Surgeons      * Juwan Goldstein - Primary    Resident/Fellow/Other Assistant:  Surgeons and Role:  * No surgeons found with a matching role *    Procedure Summary  Anesthesia: Anesthesia type not filed in the log.  ASA: II  Anesthesia Staff: Anesthesiologist: Guillermo Guerra MD  CRNA: JACOB Watson  Estimated Blood Loss: 0 mL  Intra-op Medications:   Administrations occurring from 0800 to 0900 on 24:   Medication Name Total Dose   lactated Ringer's infusion Cannot be calculated              Anesthesia Record               Intraprocedure I/O Totals          Intake    lactated Ringer's infusion 500.00 mL    Total Intake 500 mL          Specimen: No specimens collected     Staff:   Circulator: Karen           Drains and/or Catheters: None    Findings: Left side stone fragmented well    Indications: Diaz Caal is an 39 y.o. male who is having surgery for Left renal stone [N20.0].  He presents for left-sided shockwave lithotripsy.  Risk including infection, bleeding, injury to the kidney, obstruction for fragments, need for further procedures were reviewed.  Written consent was obtained.    Procedure Details: Patient was taken to the operating room and given general anesthesia.  He was placed in the supine position and fluoroscopy was used to target the stone.  Total of 2500 shocks were delivered to the stone and the stone appeared to fragment well.  He tolerated the procedure and was transferred to the recovery room in stable  condition.    He will follow-up in the office with a KUB in a few weeks.    Complications:  None; patient tolerated the procedure well.              Juwan Goldstein  Phone Number: [phone]

## 2024-09-13 NOTE — DISCHARGE INSTRUCTIONS
Follow up in 3 weeks.    Call [phone] with questions.  Call with fever.  You may eat a regular diet  You may shower.  No strenuous activity for 24 hours  Strain urine

## 2024-09-13 NOTE — ANESTHESIA PROCEDURE NOTES
Airway  Date/Time: 9/13/2024 7:45 AM  Urgency: elective    Airway not difficult    Staffing  Performed: CRNA   Authorized by: Guillermo Guerra MD    Performed by: OSEAS Watson-BONILLA  Patient location during procedure: OR    Indications and Patient Condition  Indications for airway management: anesthesia  Spontaneous ventilation: present  Sedation level: deep  Preoxygenated: yes  Patient position: sniffing  MILS maintained throughout  Mask difficulty assessment: 1 - vent by mask  Planned trial extubation    Final Airway Details  Final airway type: supraglottic airway      Successful airway: classic  Size 5     Number of attempts at approach: 1

## 2024-09-13 NOTE — ANESTHESIA PREPROCEDURE EVALUATION
"Patient: Diaz Caal    Procedure Information       Date/Time: 09/13/24 0800    Procedure: Lithotripsy Extracorporeal Shock Wave ( REP JIM FENG [id number] ) (Left)    Location: [facility] 01 / Virtual [facility]    Surgeons: Juwan Goldstein MD            Relevant Problems   Anesthesia  Past Surgical History:  No date: KNEE SURGERY; Left      Comment:  Arthroscopy        Pulmonary (within normal limits)  Social History    Tobacco Use      Smoking status: Never      Smokeless tobacco: Former        Types: Chew        Quit date: 11/1/2023          /Renal   (+) Left renal stone      Endocrine   (+) Obesity      Hematology (within normal limits)       Clinical information reviewed:   Tobacco  Allergies  Meds   Med Hx  Surg Hx   Fam Hx  Soc Hx    Background as per PAT:    \"In-Person       Chief Complaint: \"kidney stone\"     HPI  The patient is a 39 year old male with a past history of kidney stones.  He has never required surgical intervention.  Over the past 2 months he has had intermittent episodes of left flank pain that radiates to the left inguinal area and umbilicus.  He has associated urinary hesitancy and a weak stream.  He denied dysuria or hematuria.  The episodes would last for 1-2 hours and they would resolve spontaneously.  On 8/28/2024 he had a severe episode that lasted several hours.  He had associated chills.  His PCP ordered a CT of the abdomen and pelvis on 8/27/2024 and this demonstrated a 0.5 x 0.8 x 1.0 cm calculus at left ureteropelvic junction  producing mild left hydronephrosis.  2. 3 mm nonobstructing right renal calculus.  He was referred to [facility] ED on 8/28/2024 and Dr. Goldstein was consulted.  He was given Toradol for pain and he improved.  Surgical intervention was recommended and this is scheduled for 9/13/2024.\"      Past Medical History:   Diagnosis Date    Nephrolithiasis     Obesity        NPO Detail:  NPO/Void Status  Date of Last Liquid: 09/12/24  Time of " Last Liquid: 2100  Date of Last Solid: 09/12/24  Time of Last Solid: 2100  Time of Last Void: 0530         Physical Exam    Airway  Mallampati: I  TM distance: >3 FB     Cardiovascular   Rhythm: regular  Rate: normal     Dental - normal exam     Pulmonary - normal exam  Breath sounds clear to auscultation     Abdominal            Anesthesia Plan    History of general anesthesia?: yes  History of complications of general anesthesia?: no    ASA 2     general     The patient is not a current smoker.  Patient did not smoke on day of procedure.    Anesthetic plan and risks discussed with patient.  Use of blood products discussed with patient who consented to blood products.    Plan discussed with CRNA.

## 2024-09-13 NOTE — ANESTHESIA POSTPROCEDURE EVALUATION
Patient: Diaz Caal    Procedure Summary       Date: 09/13/24 Room / Location: [facility] 01 / [facility]    Anesthesia Start: 0734 Anesthesia Stop: 0827    Procedure: Lithotripsy Extracorporeal Shock Wave ( REP JIM FENG [phone] ) (Left) Diagnosis:       Left renal stone      (Left renal stone [N20.0])    Surgeons: Juwan Goldstein MD Responsible Provider: Guillermo Guerra MD    Anesthesia Type: general ASA Status: 2            Anesthesia Type: general    Vitals Value Taken Time   /92 09/13/24 0850   Temp 36.2 °C (97.2 °F) 09/13/24 0850   Pulse 72 09/13/24 0850   Resp 12 09/13/24 0850   SpO2 100 % 09/13/24 0850       Anesthesia Post Evaluation    Patient location during evaluation: PACU  Patient participation: complete - patient participated  Level of consciousness: awake  Pain score: 2  Pain management: adequate  Airway patency: patent  Cardiovascular status: acceptable and stable  Respiratory status: acceptable and room air  Hydration status: acceptable  Postoperative Nausea and Vomiting: none        No notable events documented.     Rifampin Counseling: I discussed with the patient the risks of rifampin including but not limited to liver damage, kidney damage, red-orange body fluids, nausea/vomiting and severe allergy.

## 2024-10-03 ENCOUNTER — APPOINTMENT (OUTPATIENT)
Dept: UROLOGY | Facility: CLINIC | Age: 39
End: 2024-10-03
Payer: COMMERCIAL

## 2024-10-03 VITALS — HEIGHT: 65 IN | WEIGHT: 242 LBS | TEMPERATURE: 97.3 F | BODY MASS INDEX: 40.32 KG/M2

## 2024-10-03 DIAGNOSIS — R82.90 ABNORMAL FINDING ON URINALYSIS: ICD-10-CM

## 2024-10-03 DIAGNOSIS — N20.0 LEFT RENAL STONE: Primary | ICD-10-CM

## 2024-10-03 LAB
POC APPEARANCE, URINE: CLEAR
POC BILIRUBIN, URINE: NEGATIVE
POC BLOOD, URINE: ABNORMAL
POC COLOR, URINE: YELLOW
POC GLUCOSE, URINE: NEGATIVE MG/DL
POC KETONES, URINE: NEGATIVE MG/DL
POC LEUKOCYTES, URINE: NEGATIVE
POC NITRITE,URINE: NEGATIVE
POC PH, URINE: 7 PH
POC PROTEIN, URINE: NEGATIVE MG/DL
POC SPECIFIC GRAVITY, URINE: 1.01
POC UROBILINOGEN, URINE: 0.2 EU/DL

## 2024-10-03 PROCEDURE — 81003 URINALYSIS AUTO W/O SCOPE: CPT | Performed by: UROLOGY

## 2024-10-03 PROCEDURE — 81001 URINALYSIS AUTO W/SCOPE: CPT

## 2024-10-03 PROCEDURE — 3008F BODY MASS INDEX DOCD: CPT | Performed by: UROLOGY

## 2024-10-03 PROCEDURE — 87086 URINE CULTURE/COLONY COUNT: CPT

## 2024-10-03 PROCEDURE — 82365 CALCULUS SPECTROSCOPY: CPT

## 2024-10-03 PROCEDURE — 99024 POSTOP FOLLOW-UP VISIT: CPT | Performed by: UROLOGY

## 2024-10-03 ASSESSMENT — PAIN SCALES - GENERAL: PAINLEVEL: 0-NO PAIN

## 2024-10-03 NOTE — PROGRESS NOTES
"  Patient is a 39 y.o. male presenting with a left renal stone    SUBJECTIVE:  HPI   He was treated with left-sided ESWL.  He passed multiple fragments, some of which he brought in today  CT scan identified an 8 mm stone in the proximal left ureter.   A 3 mm right non obstruucting renal stone was also present.     No results found for: \"URINECULTURE\"     Past Medical History:   Diagnosis Date    Nephrolithiasis     Obesity       Past Surgical History:   Procedure Laterality Date    KNEE SURGERY Left     Arthroscopy      No family history on file.   Social History     Socioeconomic History    Marital status:    Tobacco Use    Smoking status: Never    Smokeless tobacco: Former     Types: Chew     Quit date: 11/1/2023   Vaping Use    Vaping status: Never Used   Substance and Sexual Activity    Alcohol use: Yes     Alcohol/week: 2.0 standard drinks of alcohol     Types: 2 Standard drinks or equivalent per week    Drug use: Never    Sexual activity: Yes        Review of Systems   Constitutional: denies any unintentional weight loss or change in strength.  Integumentary: denies any rashes or pruritus.  Eyes: denies any double vision or eye pain.  Ear/Nose/Mouth/Throat: denies any nosebleeds or gum bleeds.  Cardiovascular: denies any chest pain or syncope.  Respiratory: denies hemoptysis.  Gastrointestinal: denies nausea or vomiting.  Musculoskeletal: denies muscle cramping or weakness.  Neurologic: denies convulsions or seizures.  Hematologic/Lymphatic: denies bleeding tendencies.  Endocrine: denies heat/cold intolerance.  All other systems have been reviewed and are negative unless otherwise noted in the HPI.    OBJECTIVE:  Visit Vitals  Temp 36.3 °C (97.3 °F)     Physical Exam   Constitutional: No obvious distress.  Eyes: Non-injected conjunctiva, sclera clear, EOMI.  Ears/Nose/Mouth/Throat: No obvious drainage per ears or nose.  Cardiovascular: Extremities are warm and well perfused. No edema, cyanosis or " "pallor.  Respiratory: No audible wheezing/stridor; respirations do not appear labored.  Gastrointestinal: Abdomen soft, not distended.  Musculoskeletal: Normal ROM of extremities.  Skin: No obvious rashes or open sores.  Neurologic: Alert and oriented, CN 2-12 grossly intact.  Psychiatric: Answers questions appropriately with normal affect.  Hematologic/Lymphatic/Immunologic: No obvious bruises or sites of spontaneous bleeding.  Genitourinary: No CVA tenderness, bladder not palpable.     Labs:  Lab Results   Component Value Date    WBC 10.7 08/28/2024    HGB 17.8 (H) 08/28/2024    HCT 51.2 08/28/2024     08/28/2024    CHOL 170 05/06/2022    TRIG 120 05/06/2022    HDL 44 05/06/2022    ALT 34 08/28/2024    AST 21 08/28/2024     08/28/2024    K 3.7 08/28/2024    CL 99 08/28/2024    CREATININE 1.00 08/28/2024    BUN 8 08/28/2024    CO2 29 08/28/2024    TSH 1.13 05/06/2022    HGBA1C 5.2 05/06/2022     No results found for: \"KPSAT\", \"KPSAP\"  IMAGING:        PROCEDURES:    ASSESSMENT/PLAN:  Problem List Items Addressed This Visit       Left renal stone    Relevant Orders    XR abdomen 1 view       He had an 8 mm stone in the proximal left ureter which was treated with ESWL.  He has a 3 mm right renal stone  Follow-up the office in 6 months with a KUB    All questions were answered to the patient’s satisfaction.  Patient agrees with the plan and wishes to proceed.  Follow-up will be scheduled appropriately.     Juwan Goldstein MD  "

## 2024-10-04 LAB
BACTERIA UR CULT: NO GROWTH
RBC #/AREA URNS AUTO: ABNORMAL /HPF
WBC #/AREA URNS AUTO: ABNORMAL /HPF

## 2024-10-07 LAB
APPEARANCE STONE: NORMAL
COMPN STONE: NORMAL
SPECIMEN WT: 42 MG

## 2025-04-08 ENCOUNTER — APPOINTMENT (OUTPATIENT)
Dept: UROLOGY | Facility: CLINIC | Age: 40
End: 2025-04-08
Payer: COMMERCIAL

## 2025-05-27 ENCOUNTER — APPOINTMENT (OUTPATIENT)
Dept: UROLOGY | Facility: CLINIC | Age: 40
End: 2025-05-27
Payer: COMMERCIAL